# Patient Record
Sex: FEMALE | Race: AMERICAN INDIAN OR ALASKA NATIVE
[De-identification: names, ages, dates, MRNs, and addresses within clinical notes are randomized per-mention and may not be internally consistent; named-entity substitution may affect disease eponyms.]

---

## 2018-02-08 ENCOUNTER — HOSPITAL ENCOUNTER (OUTPATIENT)
Dept: HOSPITAL 5 - SPVWC | Age: 58
Discharge: HOME | End: 2018-02-08
Attending: FAMILY MEDICINE
Payer: COMMERCIAL

## 2018-02-08 DIAGNOSIS — E04.1: Primary | ICD-10-CM

## 2018-02-08 DIAGNOSIS — E04.9: ICD-10-CM

## 2018-02-08 DIAGNOSIS — K59.00: ICD-10-CM

## 2018-02-08 PROCEDURE — 76536 US EXAM OF HEAD AND NECK: CPT

## 2018-02-08 NOTE — ULTRASOUND REPORT
THYROID ULTRASOUND:02/08/18 09:56:00





CLINICAL: Goiter.



FINDINGS: High-resolution ultrasound demonstrated a mildly enlarged 

thyroid with mild heterogeneous echogenicity and nodular contour.  The 

right lobe measures 4.4 x 1.9 x 1.5cm.  The left lobe measures 4.9 x 

1.7 x 1.7cm. The isthmus measures 4 mm AP thickness. A solid oval 

homogeneous isoechoic left lower lobe nodule measures 1.9 x 1.6 x 0.9 

cm. a subtle hypoechoic right upper lobe nodule measures 6 x 5 x 4 mm 

and a similar subtle hypoechoic upper medial nodule measures 4 x 4 by 4 

mm.





IMPRESSION: Mild thyroid enlargement with mild nodularity and a 

dominant 1.9 cm isoechoic left lower lobe nodule.  Recommend six month 

followup ultrasound to reevaluate size of nodules.